# Patient Record
Sex: MALE | Race: AMERICAN INDIAN OR ALASKA NATIVE
[De-identification: names, ages, dates, MRNs, and addresses within clinical notes are randomized per-mention and may not be internally consistent; named-entity substitution may affect disease eponyms.]

---

## 2022-05-20 ENCOUNTER — HOSPITAL ENCOUNTER (EMERGENCY)
Dept: HOSPITAL 5 - ED | Age: 22
Discharge: HOME | End: 2022-05-20
Payer: MEDICAID

## 2022-05-20 VITALS — DIASTOLIC BLOOD PRESSURE: 60 MMHG | SYSTOLIC BLOOD PRESSURE: 125 MMHG

## 2022-05-20 DIAGNOSIS — R07.9: Primary | ICD-10-CM

## 2022-05-20 PROCEDURE — 99282 EMERGENCY DEPT VISIT SF MDM: CPT

## 2022-05-20 NOTE — EMERGENCY DEPARTMENT REPORT
ED General Adult HPI





- General


Chief complaint: Pain General


Stated complaint: CHEST TIGHTNESS


Time Seen by Provider: 05/20/22 18:39


Source: patient


Mode of arrival: Ambulatory


Limitations: No Limitations





- History of Present Illness


Initial comments: 





20 yo black male with no pmh and no known family history of CAD presents to the 

ed for evaluation of chest pain.  He states that about 1.5 weeks ago, he had 

some midsternal chest pain and sob.  He states that the pain was non radiating, 

associated with sob, and lasted about 20 minutes then resolved.  He states that 

he had pain and sob again a few days later that was just a few minutes then 

resolved.  He states that he has not had any pain in several days but needs a a 

note in order to go back to work.  He denies cp, sob, n/v, dizziness, 

diaphoresis, and fever at this time.  


MD Complaint: chest pain


-: Sudden, minutes(s) (20)


Location: chest


Severity scale (0 -10): 0


Associated Symptoms: denies: chest pain, cough, diaphoresis, fever/chills, 

headaches, malaise, nausea/vomiting, shortness of breath, syncope, weakness


Treatments Prior to Arrival: none





- Related Data


                                    Allergies











Allergy/AdvReac Type Severity Reaction Status Date / Time


 


No Known Allergies Allergy   Unverified 05/20/22 13:42














ED Review of Systems


ROS: 


Stated complaint: CHEST TIGHTNESS


Other details as noted in HPI





Comment: All other systems reviewed and negative


Constitutional: denies: chills, fever


Respiratory: denies: cough, orthopnea, shortness of breath, SOB with exertion, 

SOB at rest, stridor, wheezing


Cardiovascular: denies: chest pain, palpitations, dyspnea on exertion, orthopne

a, edema, syncope, paroxysmal nocturnal dyspnea


Gastrointestinal: denies: abdominal pain, nausea, vomiting


Musculoskeletal: denies: back pain


Skin: denies: rash, lesions


Neurological: denies: headache, weakness





ED Past Medical Hx





- Past Medical History


Previous Medical History?: Yes





- Surgical History


Past Surgical History?: No





ED Physical Exam





- General


Limitations: No Limitations


General appearance: alert, in no apparent distress





- Head


Head exam: Present: atraumatic, normocephalic





- Eye


Eye exam: Present: normal appearance.  Absent: conjunctival injection





- Neck


Neck exam: Present: normal inspection.  Absent: lymphadenopathy





- Respiratory


Respiratory exam: Present: normal lung sounds bilaterally.  Absent: respiratory 

distress, wheezes, rales, rhonchi, stridor, chest wall tenderness





- Cardiovascular


Cardiovascular Exam: Present: regular rate, normal heart sounds





- GI/Abdominal


GI/Abdominal exam: Present: soft, normal bowel sounds.  Absent: distended, 

tenderness, guarding, rebound, rigid





- Extremities Exam


Extremities exam: Present: normal inspection, normal capillary refill.  Absent: 

pedal edema, joint swelling, calf tenderness





- Back Exam


Back exam: Present: normal inspection





- Neurological Exam


Neurological exam: Present: alert, oriented X3





- Psychiatric


Psychiatric exam: Present: normal affect, normal mood





- Skin


Skin exam: Present: warm, dry, intact, normal color





ED Course


                                   Vital Signs











  05/20/22 05/20/22





  13:42 19:55


 


Temperature 99.1 F 


 


Pulse Rate 96 H 90


 


Respiratory 18 18





Rate  


 


Blood Pressure 125/60 125/60





[Left]  


 


O2 Sat by Pulse 98 99





Oximetry  














ED Medical Decision Making





- Medical Decision Making


20 yo black male with no pmh and no known family history of CAD presents to the 

ed for evaluation of chest pain.  He states that about 1.5 weeks ago, he had 

some midsternal chest pain and sob.  He states that the pain was non radiating, 

associated with sob, and lasted about 20 minutes then resolved.  He states that 

he had pain and sob again a few days later that was just a few minutes then 

resolved.  He states that he has not had any pain in several days but needs a a 

note in order to go back to work.  He denies cp, sob, n/v, dizziness, 

diaphoresis, and fever at this time. 





No abnormalities noted on exam.  Patient denies cp, sob, n/v, dizziness, and 

diaphoresis. Patient discharged home and advised to follow up with cardiology 

and/or pcp for further evaluation and management and return to ed as needed.  He

 verbalizes understanding of and agreement with plan of care.   


Critical care attestation.: 


If time is entered above; I have spent that time in minutes in the direct care 

of this critically ill patient, excluding procedure time.








ED Disposition


Clinical Impression: 


Chest pain


Qualifiers:


 Chest pain type: unspecified Qualified Code(s): R07.9 - Chest pain, unspecified





Disposition: 01 HOME / SELF CARE / HOMELESS


Is pt being admited?: No


Does the pt Need Aspirin: No


Condition: Stable


Instructions:  Nonspecific Chest Pain, Adult


Additional Instructions: 


Follow-up with with cardiology for further evaluation and management.  Return to

 the emergency department as needed.


Referrals: 


SUSAN GREGORY MD [Staff Physician] - 3-5 Days


ALBERTO AGRAWAL MD [Staff Physician] - 3-5 Days


Forms:  Work/School Release Form(ED)


Time of Disposition: 19:32